# Patient Record
Sex: MALE | Race: WHITE | NOT HISPANIC OR LATINO | Employment: FULL TIME | URBAN - METROPOLITAN AREA
[De-identification: names, ages, dates, MRNs, and addresses within clinical notes are randomized per-mention and may not be internally consistent; named-entity substitution may affect disease eponyms.]

---

## 2019-03-21 ENCOUNTER — OFFICE VISIT (OUTPATIENT)
Dept: GASTROENTEROLOGY | Facility: CLINIC | Age: 59
End: 2019-03-21
Payer: COMMERCIAL

## 2019-03-21 VITALS
SYSTOLIC BLOOD PRESSURE: 110 MMHG | BODY MASS INDEX: 21.76 KG/M2 | HEART RATE: 58 BPM | WEIGHT: 152 LBS | DIASTOLIC BLOOD PRESSURE: 72 MMHG | HEIGHT: 70 IN

## 2019-03-21 DIAGNOSIS — R10.12 INTERMITTENT LEFT UPPER QUADRANT ABDOMINAL PAIN: Primary | ICD-10-CM

## 2019-03-21 DIAGNOSIS — Z12.11 SCREENING FOR COLON CANCER: ICD-10-CM

## 2019-03-21 PROCEDURE — 99204 OFFICE O/P NEW MOD 45 MIN: CPT | Performed by: NURSE PRACTITIONER

## 2019-03-21 RX ORDER — TRIAMTERENE AND HYDROCHLOROTHIAZIDE 37.5; 25 MG/1; MG/1
1 TABLET ORAL DAILY PRN
COMMUNITY

## 2019-03-21 RX ORDER — MAG HYDROX/ALUMINUM HYD/SIMETH 400-400-40
SUSPENSION, ORAL (FINAL DOSE FORM) ORAL
COMMUNITY
End: 2020-11-16

## 2019-03-21 RX ORDER — CHROMIUM 200 MCG
TABLET ORAL
COMMUNITY
End: 2020-11-16

## 2019-03-21 RX ORDER — DIAZEPAM 2 MG/1
2 TABLET ORAL EVERY 6 HOURS PRN
COMMUNITY

## 2019-03-21 RX ORDER — CETIRIZINE HYDROCHLORIDE 10 MG/1
10 TABLET, CHEWABLE ORAL DAILY
COMMUNITY

## 2019-03-21 RX ORDER — ROSUVASTATIN CALCIUM 20 MG/1
20 TABLET, COATED ORAL EVERY OTHER DAY
COMMUNITY

## 2019-03-21 RX ORDER — MONTELUKAST SODIUM 10 MG/1
10 TABLET ORAL
COMMUNITY

## 2019-03-21 RX ORDER — LORATADINE 10 MG/1
10 TABLET ORAL DAILY
COMMUNITY

## 2019-03-21 NOTE — PROGRESS NOTES
[id number] - Outpatient Consultation  Lucia Huitron 60 y o  male MRN: [id number]  Encounter: [id number]    ASSESSMENT AND PLAN:      1  Intermittent left upper quadrant abdominal pain  Intermittent episodes of left upper quadrant abdominal pain a few weeks ago that resolved spontaneously  Pain was described as crampy and transient  Only lasted a few seconds  No alarm symptoms  Pain free for over the past week  Etiology clear and possibly functional related  Would not pursue a workup at the present time as he is asymptomatic  I did advise the patient that if pain should reoccur that he should call our office immediately we will order blood tests, a CT scan of the abdomen and possibly arrange an upper endoscopy  2  Screening for colon cancer  He is at average risk for colorectal neoplasm  He denies ever having a a personal history of colon polyps, family history of colon polyps or family history of colon cancer  Last colonoscopy was performed in October of 2010 and showed the absence of adenomatous colon polyps  The preparation was good  Internal hemorrhoids were identified  The patient was informed that screening colonoscopy should be performed every 7 years  I did explain to the patient that current guidelines provided by the [facility] [employer] Task Force of Colorectal Cancer, composed of the [employer] of Gastroenterology, the American gastroenterological association and the American Society for Gastrointestinal Endoscopy, advise a colonoscopy to be performed every 10 years in an average risk individual for colorectal neoplasia, asymptomatic from a GI perspective      - will place an recall and advise a colonoscopy to be performed in October of 2020 unless symptoms dictate the necessity to perform colonoscopy prior to that time      Followup Appointment[de-identified]  As needed  ______________________________________________________________________    Chief Complaint Patient presents with    LUQ Pain     Intermittent    Due for Colonoscopy       HPI:   Mony Carias is a 60y o  year old male who presents to arrange for a screening colonoscopy  Denies any rectal bleeding, melena, abdominal pain, nausea, vomiting, anorexia or weight loss  Last colonoscopy was performed in 2010 and was a negative examination for adenomatous colon polyps  Denies any prior history of colon polyps, family history of colon cancer or colon polyps  He did experience left upper quadrant abdominal pain a few weeks ago that has entirely resolved spontaneously  Described as a crampy stabbing discomfort that lasts a few seconds and then abated with time  No provoking or alleviating factors with the exception of time  Denies any heartburn, dysphagia, odynophagia or early satiety      Historical Information   Past Medical History:   Diagnosis Date    Anxiety     Environmental and seasonal allergies     Meniere disease     Prostatitis      Past Surgical History:   Procedure Laterality Date    WISDOM TOOTH EXTRACTION       Social History     Substance and Sexual Activity   Alcohol Use Never    Frequency: Never     Social History     Substance and Sexual Activity   Drug Use Not on file     Social History     Tobacco Use   Smoking Status Never Smoker   Smokeless Tobacco Never Used     Family History   Problem Relation Age of Onset    No Known Problems Mother     Heart disease Father     No Known Problems Brother        Meds/Allergies     Current Outpatient Medications:     aspirin 81 MG tablet    cetirizine (ZyrTEC) 10 MG chewable tablet    diazepam (VALIUM) 2 mg tablet    L-Lysine 500 MG CAPS    loratadine (CLARITIN) 10 mg tablet    montelukast (SINGULAIR) 10 mg tablet    rosuvastatin (CRESTOR) 20 MG tablet    Saw Palmetto 450 MG CAPS    triamterene-hydrochlorothiazide (MAXZIDE-25) 37 5-25 mg per tablet    No Known Allergies    PHYSICAL EXAM:    Blood pressure 110/72, pulse 58, height 5' 10" (1 778 m), weight 68 9 kg (152 lb)  Body mass index is 21 81 kg/m²  General Appearance: NAD, cooperative, alert  HEENT:  Normocephalic, atraumatic, anicteric  Lungs:  Clear to auscultation bilaterally; no rales, rhonchi or wheezing; respirations unlabored   Heart: Regular rate and rhythm; no murmur, rub, or gallop  Abdomen:  Soft, non-tender, non-distended; normal bowel sounds; no masses, no organomegaly   Rectal:  Deferred   Extremities: No cyanosis, clubbing or edema   Skin: No jaundice, rashes, or lesions   Psych: Normal affect, good eye contact  Neuro: No gross deficits, AAOx3    Lab Results:   No results found for: WBC, HGB, HCT, MCV, PLT  No results found for: NA, K, CL, CO2, ANIONGAP, BUN, CREATININE, GLUCOSE, GLUF, CALCIUM, CORRECTEDCA, AST, ALT, ALKPHOS, PROT, BILITOT, EGFR  No results found for: IRON, TIBC, FERRITIN  No results found for: LIPASE    Radiology Results:   No results found  REVIEW OF SYSTEMS:    CONSTITUTIONAL: Denies any fever, chills, rigors, and weight loss  HEENT: No earache or tinnitus  Denies hearing loss or visual disturbances  CARDIOVASCULAR: No chest pain or palpitations  RESPIRATORY: Denies any cough, hemoptysis, shortness of breath or dyspnea on exertion  GASTROINTESTINAL: As noted in the History of Present Illness  GENITOURINARY: No problems with urination  Denies any hematuria or dysuria  NEUROLOGIC: No dizziness or vertigo, denies headaches  MUSCULOSKELETAL: Denies any muscle or joint pain  SKIN: Denies skin rashes or itching  ENDOCRINE: Denies excessive thirst  Denies intolerance to heat or cold  PSYCHOSOCIAL: Denies depression or anxiety  Denies any recent memory loss

## 2019-03-21 NOTE — PATIENT INSTRUCTIONS
History of left lower quadrant abdominal pain currently resolved  Screening colonoscopy to be arranged in October of 2020

## 2020-05-26 ENCOUNTER — TELEPHONE (OUTPATIENT)
Dept: GASTROENTEROLOGY | Facility: CLINIC | Age: 60
End: 2020-05-26

## 2020-05-27 ENCOUNTER — OFFICE VISIT (OUTPATIENT)
Dept: GASTROENTEROLOGY | Facility: CLINIC | Age: 60
End: 2020-05-27
Payer: COMMERCIAL

## 2020-05-27 VITALS
TEMPERATURE: 99.9 F | BODY MASS INDEX: 24.34 KG/M2 | HEIGHT: 70 IN | SYSTOLIC BLOOD PRESSURE: 116 MMHG | HEART RATE: 72 BPM | WEIGHT: 170 LBS | DIASTOLIC BLOOD PRESSURE: 88 MMHG

## 2020-05-27 DIAGNOSIS — Z12.11 SCREENING FOR COLON CANCER: ICD-10-CM

## 2020-05-27 DIAGNOSIS — K64.8 INTERNAL HEMORRHOIDS: ICD-10-CM

## 2020-05-27 DIAGNOSIS — K62.89 ANORECTAL PAIN: Primary | ICD-10-CM

## 2020-05-27 PROCEDURE — 99214 OFFICE O/P EST MOD 30 MIN: CPT | Performed by: INTERNAL MEDICINE

## 2020-11-09 ENCOUNTER — TELEMEDICINE (OUTPATIENT)
Dept: GASTROENTEROLOGY | Facility: CLINIC | Age: 60
End: 2020-11-09

## 2020-11-09 VITALS — BODY MASS INDEX: 24.34 KG/M2 | HEIGHT: 70 IN | WEIGHT: 170 LBS

## 2020-11-09 DIAGNOSIS — Z12.11 SCREENING FOR COLON CANCER: Primary | ICD-10-CM

## 2020-11-09 RX ORDER — SODIUM PICOSULFATE, MAGNESIUM OXIDE, AND ANHYDROUS CITRIC ACID 10; 3.5; 12 MG/160ML; G/160ML; G/160ML
LIQUID ORAL
Qty: 2 BOTTLE | Refills: 0 | Status: SHIPPED | OUTPATIENT
Start: 2020-11-09 | End: 2020-11-16 | Stop reason: HOSPADM

## 2020-11-16 ENCOUNTER — HOSPITAL ENCOUNTER (OUTPATIENT)
Dept: GASTROENTEROLOGY | Facility: AMBULATORY SURGERY CENTER | Age: 60
Discharge: HOME | End: 2020-11-16
Payer: COMMERCIAL

## 2020-11-16 ENCOUNTER — ANESTHESIA (OUTPATIENT)
Dept: GASTROENTEROLOGY | Facility: AMBULATORY SURGERY CENTER | Age: 60
End: 2020-11-16

## 2020-11-16 ENCOUNTER — ANESTHESIA EVENT (OUTPATIENT)
Dept: GASTROENTEROLOGY | Facility: AMBULATORY SURGERY CENTER | Age: 60
End: 2020-11-16

## 2020-11-16 VITALS
OXYGEN SATURATION: 99 % | SYSTOLIC BLOOD PRESSURE: 144 MMHG | RESPIRATION RATE: 30 BRPM | HEART RATE: 67 BPM | TEMPERATURE: 97.9 F | DIASTOLIC BLOOD PRESSURE: 80 MMHG

## 2020-11-16 DIAGNOSIS — K64.8 INTERNAL HEMORRHOIDS: Primary | ICD-10-CM

## 2020-11-16 DIAGNOSIS — Z12.11 SCREENING FOR COLON CANCER: ICD-10-CM

## 2020-11-16 PROCEDURE — 45380 COLONOSCOPY AND BIOPSY: CPT | Performed by: INTERNAL MEDICINE

## 2020-11-16 PROCEDURE — 88305 TISSUE EXAM BY PATHOLOGIST: CPT | Performed by: PATHOLOGY

## 2020-11-16 RX ORDER — SODIUM CHLORIDE 9 MG/ML
50 INJECTION, SOLUTION INTRAVENOUS CONTINUOUS
Status: DISCONTINUED | OUTPATIENT
Start: 2020-11-16 | End: 2020-11-20 | Stop reason: HOSPADM

## 2020-11-16 RX ORDER — PROPOFOL 10 MG/ML
INJECTION, EMULSION INTRAVENOUS AS NEEDED
Status: DISCONTINUED | OUTPATIENT
Start: 2020-11-16 | End: 2020-11-16

## 2020-11-16 RX ORDER — HYDROCORTISONE ACETATE 25 MG/1
25 SUPPOSITORY RECTAL 2 TIMES DAILY
Qty: 12 SUPPOSITORY | Refills: 0 | Status: SHIPPED | OUTPATIENT
Start: 2020-11-16

## 2020-11-16 RX ADMIN — PROPOFOL 30 MG: 10 INJECTION, EMULSION INTRAVENOUS at 08:33

## 2020-11-16 RX ADMIN — SODIUM CHLORIDE 50 ML/HR: 9 INJECTION, SOLUTION INTRAVENOUS at 08:18

## 2020-11-16 RX ADMIN — PROPOFOL 20 MG: 10 INJECTION, EMULSION INTRAVENOUS at 08:35

## 2020-11-16 RX ADMIN — PROPOFOL 20 MG: 10 INJECTION, EMULSION INTRAVENOUS at 08:37

## 2020-11-16 RX ADMIN — PROPOFOL 30 MG: 10 INJECTION, EMULSION INTRAVENOUS at 08:31

## 2020-11-16 RX ADMIN — PROPOFOL 50 MG: 10 INJECTION, EMULSION INTRAVENOUS at 08:30

## 2020-11-16 RX ADMIN — PROPOFOL 30 MG: 10 INJECTION, EMULSION INTRAVENOUS at 08:42

## 2020-11-16 RX ADMIN — PROPOFOL 20 MG: 10 INJECTION, EMULSION INTRAVENOUS at 08:39

## 2024-02-21 ENCOUNTER — OFFICE VISIT (OUTPATIENT)
Dept: GASTROENTEROLOGY | Facility: CLINIC | Age: 64
End: 2024-02-21
Payer: COMMERCIAL

## 2024-02-21 VITALS
WEIGHT: 180.6 LBS | HEIGHT: 68 IN | BODY MASS INDEX: 27.37 KG/M2 | DIASTOLIC BLOOD PRESSURE: 78 MMHG | SYSTOLIC BLOOD PRESSURE: 122 MMHG

## 2024-02-21 DIAGNOSIS — K64.8 INTERNAL HEMORRHOIDS: Primary | ICD-10-CM

## 2024-02-21 DIAGNOSIS — M53.3 COCCYDYNIA: ICD-10-CM

## 2024-02-21 DIAGNOSIS — Z86.010 PERSONAL HISTORY OF COLONIC POLYPS: ICD-10-CM

## 2024-02-21 PROBLEM — Z12.11 SCREENING FOR COLON CANCER: Status: RESOLVED | Noted: 2019-03-21 | Resolved: 2024-02-21

## 2024-02-21 PROCEDURE — 99214 OFFICE O/P EST MOD 30 MIN: CPT | Performed by: INTERNAL MEDICINE

## 2024-02-21 RX ORDER — SILDENAFIL 100 MG/1
TABLET, FILM COATED ORAL
COMMUNITY

## 2024-02-21 NOTE — PROGRESS NOTES
[facility] - Outpatient Follow-up Note  Abhishek Villalba 63 y.o. male MRN: [id number]  Encounter: [id number]    ASSESSMENT AND PLAN:      1. Internal hemorrhoids  2. Coccydynia  Complains of ongoing but intermittent pain near his tailbone.  Often related to standing up or positional changes.  No change with the bowel cycle  No change with pelvic floor physical therapy  Rectal exam shows an internal hemorrhoid in the right posterior position but palpation does not reduce symptoms    Palpation of the sacrum and coccyx seems to reproduce his symptoms.  He will further research coccydynia and speak with his PCP, may benefit from orthopedic/spine evaluation    3. Personal history of colonic polyps  Adenoma November 2020, 5-year recall recommended      Followup Appointment: As needed  ______________________________________________________________________    Chief Complaint   Patient presents with    Hemorrhoids     Pt would like his hemorrhoids checked, occasional bleeding when wiping. Pt would like to discuss if the lower abdominal pain he experiences could be related to the hemorrhoids. Pt has gone to PT, has not helped symptoms. Was told may be pelvic floor related. Would like to confirm when due for next screening colonoscopy.     HPI: The patient presents for follow-up on anal rectal pain.  He was last seen at the time of a screening colonoscopy in November 2020.  Since then he has completed pelvic floor physical therapy but his symptoms persist.  He describes pain around the tailbone or within  his intergluteal cleft.  It is often worse when standing up from a seated position.  Is often painful when lifting.  If he contracts his pelvic floor muscles it often reproduces symptoms.  He notices occasional blood on the toilet tissue.  He denies any constipation or straining.  There is no abdominal pain.  He denies any upper GI complaints.  He has used hydrocortisone suppositories  "intermittently with questionable benefit.    Historical Information   Past Medical History:   Diagnosis Date    Anxiety     Colon polyp 2020    Environmental and seasonal allergies     Meniere disease     Prostatitis      Past Surgical History:   Procedure Laterality Date    COLONOSCOPY  11/2020    WISDOM TOOTH EXTRACTION       Social History     Substance and Sexual Activity   Alcohol Use Yes    Comment: Occasional wine cooler     Social History     Substance and Sexual Activity   Drug Use Never     Social History     Tobacco Use   Smoking Status Never    Passive exposure: Never   Smokeless Tobacco Never     Family History   Problem Relation Age of Onset    Irritable bowel syndrome Mother     Heart disease Father     No Known Problems Brother     Colon cancer Neg Hx     Colon polyps Neg Hx          Current Outpatient Medications:     aspirin 81 MG tablet    cetirizine (ZyrTEC) 10 MG chewable tablet    diazepam (VALIUM) 2 mg tablet    hydrocortisone (ANUSOL-HC) 25 mg suppository    loratadine (CLARITIN) 10 mg tablet    montelukast (SINGULAIR) 10 mg tablet    rosuvastatin (CRESTOR) 20 MG tablet    sildenafil (VIAGRA) 100 mg tablet    triamterene-hydrochlorothiazide (MAXZIDE-25) 37.5-25 mg per tablet  No Known Allergies  Reviewed medications and allergies and updated as indicated    PHYSICAL EXAM:    Blood pressure 122/78, height 5' 8\" (1.727 m), weight 81.9 kg (180 lb 9.6 oz). Body mass index is 27.46 kg/m².  General Appearance: NAD, cooperative, alert  Eyes: Anicteric, conjunctiva pink  ENT:  Normocephalic, atraumatic, normal mucosa.    Neck:  Supple, symmetrical, trachea midline  Resp:  Clear to auscultation bilaterally; no rales, rhonchi or wheezing; respirations unlabored   CV:  S1 S2, Regular rate and rhythm; no murmur, rub, or gallop.  GI:  Soft, non-tender, non-distended; normal bowel sounds; no masses, no organomegaly   Rectal: Grade 2 hemorrhoid right posterior.  Good sphincter tone, tenderness with " "palpation of coccyx or sacrum  Musculoskeletal: No cyanosis, clubbing or edema. Normal ROM.  Skin:  No jaundice, rashes, or lesions   Heme/Lymph: No palpable cervical lymphadenopathy  Psych: Normal affect, good eye contact  Neuro: No gross deficits, AAOx3    Lab Results:   No results found for: \"WBC\", \"HGB\", \"HCT\", \"MCV\", \"PLT\"  No results found for: \"NA\", \"K\", \"CL\", \"CO2\", \"ANIONGAP\", \"BUN\", \"CREATININE\", \"GLUCOSE\", \"GLUF\", \"CALCIUM\", \"CORRECTEDCA\", \"AST\", \"ALT\", \"ALKPHOS\", \"PROT\", \"BILITOT\", \"EGFR\"    Radiology Results:   No results found.    "

## 2024-08-01 ENCOUNTER — HOSPITAL ENCOUNTER (OUTPATIENT)
Dept: HOSPITAL 99 - RAD | Age: 64
End: 2024-08-01
Payer: MEDICARE

## 2024-08-01 DIAGNOSIS — M79.672: Primary | ICD-10-CM
